# Patient Record
Sex: FEMALE | Race: WHITE
[De-identification: names, ages, dates, MRNs, and addresses within clinical notes are randomized per-mention and may not be internally consistent; named-entity substitution may affect disease eponyms.]

---

## 2017-02-02 ENCOUNTER — HOSPITAL ENCOUNTER (EMERGENCY)
Dept: HOSPITAL 80 - CED | Age: 42
Discharge: HOME | End: 2017-02-02
Payer: COMMERCIAL

## 2017-02-02 VITALS
RESPIRATION RATE: 18 BRPM | TEMPERATURE: 97 F | OXYGEN SATURATION: 97 % | DIASTOLIC BLOOD PRESSURE: 78 MMHG | HEART RATE: 72 BPM | SYSTOLIC BLOOD PRESSURE: 127 MMHG

## 2017-02-02 DIAGNOSIS — R10.9: Primary | ICD-10-CM

## 2017-02-02 LAB
% IMMATURE GRANULYOCYTES: 0.3 % (ref 0–1.1)
ABSOLUTE IMMATURE GRANULOCYTES: 0.02 10^3/UL (ref 0–0.1)
ABSOLUTE NRBC COUNT: 0 10^3/UL (ref 0–0.01)
ADD DIFF?: NO
ADD MORPH?: NO
ADD SCAN?: NO
ALBUMIN SERPL-MCNC: 3.7 G/DL (ref 3.5–5)
ALP SERPL-CCNC: 51 IU/L (ref 38–126)
ALT SERPL-CCNC: 33 IU/L (ref 9–52)
ANION GAP SERPL CALC-SCNC: 7 MEQ/L (ref 8–16)
AST SERPL-CCNC: 21 IU/L (ref 14–46)
ATYPICAL LYMPHOCYTE FLAG: 10 (ref 0–99)
BILIRUB SERPL-MCNC: 0.6 MG/DL (ref 0.1–1.4)
CALCIUM SERPL-MCNC: 9 MG/DL (ref 8.5–10.4)
CHLORIDE SERPL-SCNC: 105 MEQ/L (ref 97–110)
CO2 SERPL-SCNC: 23 MEQ/L (ref 22–31)
COLOR UR: YELLOW
CREAT SERPL-MCNC: 0.8 MG/DL (ref 0.6–1)
ERYTHROCYTE [DISTWIDTH] IN BLOOD BY AUTOMATED COUNT: 11.9 % (ref 11.5–15.2)
ERYTHROCYTE [SEDIMENTATION RATE] IN BLOOD BY WESTERGREN METHOD: 8 MM/HR (ref 0–20)
FRAGMENT RBC FLAG: 0 (ref 0–99)
GFR SERPL CREATININE-BSD FRML MDRD: > 60 ML/MIN/{1.73_M2}
GLUCOSE SERPL-MCNC: 92 MG/DL (ref 70–100)
HCT VFR BLD CALC: 37.4 % (ref 38–47)
HGB BLD-MCNC: 13 G/DL (ref 12.6–16.3)
LEFT SHIFT FLG: 0 (ref 0–99)
LIPEMIA HEMOLYSIS FLAG: 90 (ref 0–99)
MAGNESIUM SERPL-MCNC: 1.9 MG/DL (ref 1.6–2.3)
MCH RBC BLDCO QN: 32.3 PG (ref 27.9–34.1)
MCHC RBC AUTO-ENTMCNC: 34.8 G/DL (ref 32.4–36.7)
MCV RBC AUTO: 92.8 FL (ref 81.5–99.8)
NITRITE UR QL STRIP: NEGATIVE
NRBC-AUTO%: 0 % (ref 0–0.2)
PH UR STRIP: 8 [PH] (ref 5–7.5)
PLATELET # BLD: 216 10^3/UL (ref 150–400)
PLATELET CLUMPS FLAG: 0 (ref 0–99)
PMV BLD AUTO: 9.9 FL (ref 8.7–11.7)
POTASSIUM SERPL-SCNC: 4.1 MEQ/L (ref 3.5–5.2)
PROT SERPL-MCNC: 6.5 G/DL (ref 6.3–8.2)
RBC # BLD AUTO: 4.03 10^6/UL (ref 4.18–5.33)
SODIUM SERPL-SCNC: 135 MEQ/L (ref 134–144)
SP GR UR STRIP: 1.01 (ref 1–1.03)

## 2017-02-02 NOTE — UCPHY
H & P


Time Seen by Provider: 02/02/17 15:13


Patient Type: Established


HPI/ROS: 


41-year-old female presents complaining of abdominal pain radiating to her left 

shoulder for 7 days she also states she has had a fever as high as 97.


She states she has had GI problems for many many years.  She does not currently 

have a regular primary care physician however she does state that she is 

scheduled to see a gastroenterologist tomorrow on February 3, 2017.


She denies diarrhea she denies constipation


No nausea or vomiting today


She is concerned that her low temperature often ranging between 95-97 at home 

may be a sign of SIRS.  She has no recent history of acute infections or signs 

of sepsis as far she knows.





Review of systems


As per HPI


General no fever no chills no weakness


HEENT no eye pain no eye discharge. No eye redness, no sore throat


Respiratory no cough, no shortness of breath


Cardiac no chest pain, no peripheral edema


GI positive abdominal pain, no diarrhea, no constipation, no nausea, no vomiting


  no flank pain, no hematuria, no dysuria


Musculoskeletal no myalgias, no joint pain


Heme  no easy bruising, no easy bleeding


Endo no polyuria, no polydipsia


Skin no rashes, no pruritus


Neuro no syncope, no dizziness, no headaches


Psych is no suicidal ideation, no homicidal ideation





Past Medical/Surgical History: 


?  Irritable bowel syndrome


Social History: 


Denies alcohol or drug use


Smoking Status: Never smoked


Physical Exam: 


41-year-old female alert and oriented


HEENT atraumatic normocephalic, extraocular muscles intact, anicteric


Oropharynx negative for erythema negative exudate, tolerating her own secretions


Neck supple no meningismus


Lungs clear to auscultation bilaterally


Heart regular rate and rhythm without murmur rub or gallop


Abdomen nondistended normoactive bowel sounds soft nontender


Back no CVA tenderness, no step-offs, no spinal tenderness


Extremities no cyanosis clubbing or edema


Neuro alert and oriented, no focal deficits


Constitutional: 


 Initial Vital Signs











Temperature (C)  36.1 C   02/02/17 14:50


 


Heart Rate  72   02/02/17 14:50


 


Respiratory Rate  18   02/02/17 14:50


 


Blood Pressure  127/78 H  02/02/17 14:50


 


O2 Sat (%)  97   02/02/17 14:50








 











O2 Delivery Mode               Room Air














Allergies/Adverse Reactions: 


 





diphenhydramine HCl [From Benadryl] Allergy (Verified 02/27/16 08:16)


 


latex [Latex] Allergy (Verified 02/27/16 08:16)


 


promethazine HCl [From Phenergan] Allergy (Verified 02/27/16 08:16)


 


iv contrast Allergy (Uncoded 02/27/16 08:16)


 








Home Medications: 














 Medication  Instructions  Recorded


 


NK [No Known Home Meds]  01/04/16














Medical Decision Making


ED Course/Re-evaluation: 


Patient seen and evaluated for abdominal pain radiating to her left shoulder of 

1 weeks duration though she is currently pain free she feels this may be a 

result of having drank apple cider vinegar with an apple last night.





Her physical exam is completely normal, she appears well.





Labs


CBC normal white blood cell count no evidence of anemia


CMP liver function normal, renal function normal


Lipase normal


Erythrocyte sedimentation rate normal


TSH normal





Impression


Abdominal pain unknown etiology





Plan


Follow-up with a primary care physician


Keep her appointment with GI as scheduled for tomorrow February 3, 2017





- Data Points


Laboratory Results: 


 Laboratory Results





 02/02/17 15:41 





 02/02/17 15:41 





 











  02/02/17 02/02/17





  16:00 15:41


 


WBC    5.75 10^3/uL





    (3.80-9.50) 


 


RBC    4.03 L 10^6/uL





    (4.18-5.33) 


 


Hgb    13.0 g/dL





    (12.6-16.3) 


 


Hct    37.4 L %





    (38.0-47.0) 


 


MCV    92.8 fL





    (81.5-99.8) 


 


MCH    32.3 pg





    (27.9-34.1) 


 


MCHC    34.8 g/dL





    (32.4-36.7) 


 


RDW    11.9 %





    (11.5-15.2) 


 


Plt Count    216 10^3/uL





    (150-400) 


 


MPV    9.9 fL





    (8.7-11.7) 


 


Neut % (Auto)    67.9 %





    (39.3-74.2) 


 


Lymph % (Auto)    23.0 %





    (15.0-45.0) 


 


Mono % (Auto)    7.8 %





    (4.5-13.0) 


 


Eos % (Auto)    0.5 L %





    (0.6-7.6) 


 


Baso % (Auto)    0.5 %





    (0.3-1.7) 


 


Nucleat RBC Rel Count    0.0 %





    (0.0-0.2) 


 


Absolute Neuts (auto)    3.90 10^3/uL





    (1.70-6.50) 


 


Absolute Lymphs (auto)    1.32 10^3/uL





    (1.00-3.00) 


 


Absolute Monos (auto)    0.45 10^3/uL





    (0.30-0.80) 


 


Absolute Eos (auto)    0.03 10^3/uL





    (0.03-0.40) 


 


Absolute Basos (auto)    0.03 10^3/uL





    (0.02-0.10) 


 


Absolute Nucleated RBC    0.00 10^3/uL





    (0-0.01) 


 


Immature Gran %    0.3 %





    (0.0-1.1) 


 


Immature Gran #    0.02 10^3/uL





    (0.00-0.10) 


 


ESR    8 MM/HR





    (0-20) 


 


Sodium    135 mEq/L





    (134-144) 


 


Potassium    4.1 mEq/L





    (3.5-5.2) 


 


Chloride    105 mEq/L





    () 


 


Carbon Dioxide    23 mEq/l





    (22-31) 


 


Anion Gap    7 mEq/L





    (8-16) 


 


BUN    12 mg/dL





    (7-23) 


 


Creatinine    0.8 mg/dL





    (0.6-1.0) 


 


Estimated GFR    > 60 





   


 


Glucose    92 mg/dL





    () 


 


Calcium    9.0 mg/dL





    (8.5-10.4) 


 


Magnesium    1.9 mg/dL





    (1.6-2.3) 


 


Total Bilirubin    0.6 mg/dL





    (0.1-1.4) 


 


AST    21 IU/L





    (14-46) 


 


ALT    33 IU/L





    (9-52) 


 


Alkaline Phosphatase    51 IU/L





    () 


 


Total Protein    6.5 g/dL





    (6.3-8.2) 


 


Albumin    3.7 g/dL





    (3.5-5.0) 


 


Lipase    105.0 IU/L





    () 


 


TSH    3.770 uIU/mL





    (0.465-4.680) 


 


Urine Color  YELLOW   





   


 


Urine Appearance  CLEAR   





   


 


Urine pH  8.0 H   





   (5.0-7.5)  


 


Ur Specific Gravity  1.010   





   (1.002-1.030)  


 


Urine Protein  NEGATIVE   





   (NEGATIVE)  


 


Urine Ketones  NEGATIVE   





   (NEGATIVE)  


 


Urine Blood  NEGATIVE   





   (NEGATIVE)  


 


Urine Nitrate  NEGATIVE   





   (NEGATIVE)  


 


Urine Bilirubin  NEGATIVE   





   (NEGATIVE)  


 


Urine Urobilinogen  0.2 EU  





   (0.2-1.0)  


 


Ur Leukocyte Esterase  NEGATIVE   





   (NEGATIVE)  


 


Ur Culture Indicated?  NOT INDICATED   





   (NI)  


 


Urine Glucose  NEGATIVE   





   (NEGATIVE)  











Medications Given: 


 








Discontinued Medications





Sodium Chloride (Ns)  1,000 mls @ 0 mls/hr IV ONCE ONE


   PRN Reason: Wide Open


   Stop: 02/02/17 14:45


   Last Admin: 02/02/17 15:48 Dose:  Not Given








Departure





- Departure


Disposition: Home, Routine, Self-Care


Clinical Impression: 


 Abdominal pain


Condition: Good


Instructions:  Abdominal Pain (ED)


Additional Instructions: 


KEEP YOUR APPOINTMENTS AS PLANNED


Referrals: 


NONE *PRIMARY CARE P,. [Primary Care Provider] - As per Instructions


[facility] [Provider Group] - As per Instructions





- PQRS


PQRS Measurement: 


NA

## 2017-02-16 ENCOUNTER — HOSPITAL ENCOUNTER (OUTPATIENT)
Dept: HOSPITAL 80 - CIMAGING | Age: 42
End: 2017-02-16
Attending: PHYSICIAN ASSISTANT
Payer: COMMERCIAL

## 2017-02-16 DIAGNOSIS — R10.9: Primary | ICD-10-CM

## 2017-05-02 ENCOUNTER — HOSPITAL ENCOUNTER (EMERGENCY)
Dept: HOSPITAL 80 - CED | Age: 42
Discharge: HOME | End: 2017-05-02
Payer: COMMERCIAL

## 2017-05-02 VITALS
TEMPERATURE: 97.9 F | OXYGEN SATURATION: 63 % | DIASTOLIC BLOOD PRESSURE: 78 MMHG | HEART RATE: 59 BPM | RESPIRATION RATE: 16 BRPM | SYSTOLIC BLOOD PRESSURE: 111 MMHG

## 2017-05-02 DIAGNOSIS — R10.13: Primary | ICD-10-CM

## 2017-05-02 LAB
% IMMATURE GRANULYOCYTES: 0.4 % (ref 0–1.1)
ABSOLUTE IMMATURE GRANULOCYTES: 0.02 10^3/UL (ref 0–0.1)
ABSOLUTE NRBC COUNT: 0 10^3/UL (ref 0–0.01)
ADD DIFF?: NO
ADD MORPH?: NO
ADD SCAN?: NO
ALBUMIN SERPL-MCNC: 4.1 G/DL (ref 3.5–5)
ALP SERPL-CCNC: 51 IU/L (ref 38–126)
ALT SERPL-CCNC: 35 IU/L (ref 9–52)
ANION GAP SERPL CALC-SCNC: 13 MEQ/L (ref 8–16)
AST SERPL-CCNC: 19 IU/L (ref 14–46)
ATYPICAL LYMPHOCYTE FLAG: 30 (ref 0–99)
BILIRUB SERPL-MCNC: 0.5 MG/DL (ref 0.1–1.4)
BILIRUBIN-CONJUGATED: 0.1 MG/DL (ref 0–0.5)
BILIRUBIN-UNCONJUGATED: 0.4 MG/DL (ref 0–1.1)
CALCIUM SERPL-MCNC: 9.1 MG/DL (ref 8.5–10.4)
CHLORIDE SERPL-SCNC: 101 MEQ/L (ref 97–110)
CO2 SERPL-SCNC: 20 MEQ/L (ref 22–31)
CREAT SERPL-MCNC: 0.8 MG/DL (ref 0.6–1)
ERYTHROCYTE [DISTWIDTH] IN BLOOD BY AUTOMATED COUNT: 11.9 % (ref 11.5–15.2)
FRAGMENT RBC FLAG: 0 (ref 0–99)
GFR SERPL CREATININE-BSD FRML MDRD: > 60 ML/MIN/{1.73_M2}
GLUCOSE SERPL-MCNC: 85 MG/DL (ref 70–100)
HCT VFR BLD CALC: 35.7 % (ref 38–47)
HGB BLD-MCNC: 12.6 G/DL (ref 12.6–16.3)
LEFT SHIFT FLG: 0 (ref 0–99)
LIPEMIA HEMOLYSIS FLAG: 90 (ref 0–99)
MCH RBC BLDCO QN: 31.9 PG (ref 27.9–34.1)
MCHC RBC AUTO-ENTMCNC: 35.3 G/DL (ref 32.4–36.7)
MCV RBC AUTO: 90.4 FL (ref 81.5–99.8)
NRBC-AUTO%: 0 % (ref 0–0.2)
PLATELET # BLD: 233 10^3/UL (ref 150–400)
PLATELET CLUMPS FLAG: 10 (ref 0–99)
PMV BLD AUTO: 10 FL (ref 8.7–11.7)
POTASSIUM SERPL-SCNC: 4.3 MEQ/L (ref 3.5–5.2)
PROT SERPL-MCNC: 6.8 G/DL (ref 6.3–8.2)
RBC # BLD AUTO: 3.95 10^6/UL (ref 4.18–5.33)
SODIUM SERPL-SCNC: 134 MEQ/L (ref 134–144)

## 2017-05-02 NOTE — CPEKG
Heart Rate: 67

RR Interval: 896

P-R Interval: 120

QRSD Interval: 72

QT Interval: 392

QTC Interval: 414

P Axis: 71

QRS Axis: 65

T Wave Axis: 53

EKG Severity - NORMAL ECG -

EKG Impression: SINUS RHYTHM

Electronically Signed By: Alona Jiménez 02-May-2017 15:42:03
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
## 2018-07-07 ENCOUNTER — HOSPITAL ENCOUNTER (OUTPATIENT)
Dept: HOSPITAL 80 - SUPIMAGING | Age: 43
End: 2018-07-07
Payer: COMMERCIAL

## 2018-07-07 DIAGNOSIS — M25.572: Primary | ICD-10-CM
